# Patient Record
Sex: FEMALE | Race: WHITE
[De-identification: names, ages, dates, MRNs, and addresses within clinical notes are randomized per-mention and may not be internally consistent; named-entity substitution may affect disease eponyms.]

---

## 2020-11-09 ENCOUNTER — HOSPITAL ENCOUNTER (OUTPATIENT)
Dept: HOSPITAL 62 - RT | Age: 75
End: 2020-11-09
Attending: NURSE PRACTITIONER
Payer: MEDICARE

## 2020-11-09 DIAGNOSIS — I25.10: ICD-10-CM

## 2020-11-09 DIAGNOSIS — R06.02: Primary | ICD-10-CM

## 2020-11-09 DIAGNOSIS — E78.5: ICD-10-CM

## 2020-11-10 NOTE — PULMONARY FUNCTION TEST
Pulmonary Function Test


Date of Procedure:: 11/09/20


INDICATION:: Dyspnea


Referring Provider: Jesusita Figueroa [facility]


Technician: Segun Camargo, RRT





- Report


Spirometry: 





Spirometry:


 


pre-FVC: 1.39 L 52%                                     post-FVC: 1.28 L 48%    

               


 


pre-FEV:1 0.75 L 37%                                  post-FEV1: 0.73 L 36%     

                                                                                

    


 


pre-FEV1/FVC %: 54                         post-FEV1/FVC%: 57                  

predicted: 77                     


 


pre-FEF25-75%: 0.33 L 18%                        post-FEF25-75%: 0.36 L 19%


Impression: 





Moderate obstructive ventilatory defect.  Insignificant response to 

bronchodilator therapy.  This does not preclude a clinical trial of 

bronchodilator therapy.  Restrictive defect is inferred by the decreased flow in

the FVC.  (Restrictive defect may mask the degree of obstruction.)  If 

clinically indicated complete pulmonary function test would be useful.

## 2020-11-16 ENCOUNTER — HOSPITAL ENCOUNTER (OUTPATIENT)
Dept: HOSPITAL 62 - WI | Age: 75
End: 2020-11-16
Attending: NURSE PRACTITIONER
Payer: MEDICARE

## 2020-11-16 DIAGNOSIS — R06.02: ICD-10-CM

## 2020-11-16 DIAGNOSIS — R91.8: ICD-10-CM

## 2020-11-16 DIAGNOSIS — Z72.0: ICD-10-CM

## 2020-11-16 DIAGNOSIS — Z12.2: ICD-10-CM

## 2020-11-16 DIAGNOSIS — R91.1: ICD-10-CM

## 2020-11-16 DIAGNOSIS — Z12.31: Primary | ICD-10-CM

## 2020-11-16 DIAGNOSIS — R09.89: ICD-10-CM

## 2020-11-16 PROCEDURE — 77067 SCR MAMMO BI INCL CAD: CPT

## 2020-11-16 PROCEDURE — G0297 LDCT FOR LUNG CA SCREEN: HCPCS

## 2020-11-17 NOTE — RADIOLOGY REPORT (SQ)
EXAM DESCRIPTION:  CT LUNG CANCER SCREENING



IMAGES COMPLETED DATE/TIME:  11/16/2020 3:23 pm



REASON FOR STUDY:  Z72.0 TOBACCO ABUSE Z87.891 PERSONAL HISTORY OF NICOTINE DEPENDENCE Z12.31  ENCNTR
 SCREEN MAMMOGRAM FOR MALIGNANT NEOPLASM OF DEBORAH Z87.891  PERSONAL HISTORY OF NICOTINE DEPENDENCE Z72.
0  TOBACCO USE

Has the patient had a Chest CT scan within the past year? N

Was the patient offered tobacco cessation counseling? Y

Was the patient engaged in shared decision making for this test? Y

Does the patient have signs or symptoms of Lung Cancer? N

Is the patient a smoker? Y

How many pack years? 55Y

How many years since quitting smoking? NA

Patients age: 75



COMPARISON:  None.



TECHNIQUE:  Low Dose CT scan performed of the chest without intravenous contrast for purposes of scre
ening for lung cancer.  Images reviewed with lung, soft tissue and bone windows.  Reconstructed coron
al and sagittal MPR images reviewed.  All images stored on PACS.

All CT scanners at this facility use dose modulation, iterative reconstruction, and/or weight based d
osing when appropriate to reduce radiation dose to as low as reasonably achievable (ALARA).

[facility]: Dose Right  [facility]: CareDose    [facility]: Dose Right    [facility]: Teradose 4D    [facility]: Smart Technologies



RADIATION DOSE:  CT Rad equipment meets quality standard of care and radiation dose reduction techniq
ues were employed. CTDIvol: 2.0 mGy. DLP: 77 mGy-cm.  mGy.

 .



LIMITATIONS:  No technical limitations.



FINDINGS:  LUNG NODULES:

1. Solid 3 mm nodule in the apical segment of the right upper lobe (image 45 of series 3).

2. Solid 5 mm nodule in the right lower lobe (image 131 of series 3).

3. Solid 3 mm nodule in the apicoposterior segment of the left upper lobe (image 42 of series 3).

REMAINING LUNGS AND PLEURA:    The trachea and main bronchi are patent.  There is upper lobe predomin
ant centrilobular emphysema associated with bronchial wall thickening and areas of subsegmental mucou
s plugging in the right upper lobe and right lower lobe (image 157 of series 3).  The curvilinear opa
cities in the lingula and dependent portion of the lower lobes could represent subsegmental atelectas
is.  There is no acute consolidation, pleural effusion or pneumothorax.

HILAR AND MEDIASTINAL STRUCTURES: There is a borderline enlarged subcarinal lymph node that measures 
12 mm in short axis diameter (image 226 of series 2).

HEART AND VASCULAR STRUCTURES: No thoracic aortic aneurysm or intramural hematoma.  No cardiomegaly o
r pericardial effusion.

CORONARY ARTERY CALCIFICATIONS: Mild.

UPPER ABDOMEN, THYROID, BONES, OTHER SOFT TISSUES: Surgical clips in the left suprarenal fossa and de
generative spondylosis of the lumbar spine.



IMPRESSION:  1. Solid less than 5 mm nodules as detailed above.

2.  Other secondary findings as detailed above.



LUNGRADS:  LUNGRADS: 2 BENIGN APPEARANCE OR BEHAVIOR.  NODULES WITH A VERY LOW LIKELIHOOD OF BECOMING
 A CLINICALLY ACTIVE CANCER DUE TO SIZE OR LACK OF GROWTH.

MODIFIER: NONE.



RECOMMENDATION:  Continue annual screening with LDCT in 12 months.



COMMENT:  CRITERIA:

Solid nodule(s):  < 6 mm; new < 4 mm.

Part solid nodule(s):  < 6 mm total diameter on baseline screening.

Non solid nodule(s) (GGN):  < 20 mm OR ? 20 and unchanged or slowly growing.

Category 3 or 4 nodules unchanged for ? 3 months.



TECHNICAL DOCUMENTATION:  JOB ID:  [id number]

Quality ID # 436: Final reports with documentation of one or more dose reduction techniques (e.g., Au
tomated exposure control, adjustment of the mA and/or kV according to patient size, use of iterative 
reconstruction technique)

 2011 [facility]



Reading location - IP/workstation name: [email or web address]

## 2020-11-25 NOTE — WOMENS IMAGING REPORT
EXAM DESCRIPTION:  BILAT SCREENING MAMMO W/CAD



IMAGES COMPLETED DATE/TIME:  11/16/2020 4:00 pm



REASON FOR STUDY:  Z12.31 ENCNTR SCREEN MAMMOGRAM FOR MALIGNANT NEOPLASM OF BREAST Z12.31  ENCNTR SCR
EEN MAMMOGRAM FOR MALIGNANT NEOPLASM OF DEBORAH Z87.891  PERSONAL HISTORY OF NICOTINE DEPENDENCE Z72.0  T
OBACCO USE



COMPARISON:  Requested but never received.



EXAM PARAMETERS:  Standard craniocaudal and mediolateral oblique views of each breast recorded using 
digital acquisition.

Read with the assistance of CAD.

.[facility] [employer]  Version 9.2



LIMITATIONS:  None.



FINDINGS:  No suspicious masses, suspicious calcifications or architectural distortion. No areas of c
oncern.



IMPRESSION:  NEGATIVE MAMMOGRAM.  BIRADS 1



BREAST DENSITY:  b. There are scattered areas of fibroglandular density.



BIRAD:  ASSESSMENT:  1 NEGATIVE



RECOMMENDATION:  ROUTINE SCREENING



COMMENT:  The patient has been notified of the results by letter per MQSA requirements. Additional no
tification policies are in place for contacting patient with suspicious or incomplete findings.

Quality ID #225: The American College of Radiology recommends an annual screening mammogram for women
 aged 40 years or over. This facility utilizes a reminder system to ensure that all patients receive 
reminder letters, and/or direct phone calls for appointments. This includes reminders for routine scr
eening mammograms, diagnostic mammograms, or other Breast Imaging Interventions when appropriate.  Th
is patient will be placed in the appropriate reminder system.



TECHNICAL DOCUMENTATION:  FINDING NUMBER: (1)

ASSESSMENT: (1)

JOB ID:  [id number]

 2011 [employer]- All Rights Reserved



Reading location - IP/workstation name: [email or web address]

## 2022-09-13 ENCOUNTER — APPOINTMENT (OUTPATIENT)
Dept: URBAN - NONMETROPOLITAN AREA CLINIC 49 | Age: 77
Setting detail: DERMATOLOGY
End: 2022-09-21

## 2022-09-13 VITALS — HEART RATE: 99 BPM | TEMPERATURE: 98.2 F | SYSTOLIC BLOOD PRESSURE: 101 MMHG | DIASTOLIC BLOOD PRESSURE: 66 MMHG

## 2022-09-13 VITALS — HEART RATE: 103 BPM | DIASTOLIC BLOOD PRESSURE: 61 MMHG | SYSTOLIC BLOOD PRESSURE: 95 MMHG

## 2022-09-13 PROBLEM — C44.729 SQUAMOUS CELL CARCINOMA OF SKIN OF LEFT LOWER LIMB, INCLUDING HIP: Status: ACTIVE | Noted: 2022-09-13

## 2022-09-13 PROCEDURE — 17313 MOHS 1 STAGE T/A/L: CPT

## 2022-09-13 PROCEDURE — OTHER MOHS SURGERY: OTHER

## 2022-09-13 NOTE — PROCEDURE: MOHS SURGERY
Implemented All Fall with Harm Risk Interventions:  [place] to call system. Call bell, personal items and telephone within reach. Instruct patient to call for assistance. Room bathroom lighting operational. Non-slip footwear when patient is off stretcher. Physically safe environment: no spills, clutter or unnecessary equipment. Stretcher in lowest position, wheels locked, appropriate side rails in place. Provide visual cue, wrist band, yellow gown, etc. Monitor gait and stability. Monitor for mental status changes and reorient to person, place, and time. Review medications for side effects contributing to fall risk. Reinforce activity limits and safety measures with patient and family. Provide visual clues: red socks. Chonodrocutaneous Helical Advancement Flap Text: The defect edges were debeveled with a #15 scalpel blade.  Given the location of the defect and the proximity to free margins a chondrocutaneous helical advancement flap was deemed most appropriate.  Using a sterile surgical marker, the appropriate advancement flap was drawn incorporating the defect and placing the expected incisions within the relaxed skin tension lines where possible.    The area thus outlined was incised deep to adipose tissue with a #15 scalpel blade.  The skin margins were undermined to an appropriate distance in all directions utilizing iris scissors.

## 2022-09-13 NOTE — PROCEDURE: MOHS SURGERY
Yes Split-Thickness Skin Graft Text: The defect edges were debeveled with a #15 scalpel blade.  Given the location of the defect, shape of the defect and the proximity to free margins a split thickness skin graft was deemed most appropriate.  Using a sterile surgical marker, the primary defect shape was transferred to the donor site. The split thickness graft was then harvested.  The skin graft was then placed in the primary defect and oriented appropriately.

## 2022-09-13 NOTE — PROCEDURE: MOHS SURGERY
Home Bilobed Transposition Flap Text: The defect edges were debeveled with a #15 scalpel blade.  Given the location of the defect and the proximity to free margins a bilobed transposition flap was deemed most appropriate.  Using a sterile surgical marker, an appropriate bilobe flap drawn around the defect.    The area thus outlined was incised deep to adipose tissue with a #15 scalpel blade.  The skin margins were undermined to an appropriate distance in all directions utilizing iris scissors.

## 2023-09-17 ENCOUNTER — HOSPITAL ENCOUNTER (EMERGENCY)
Facility: HOSPITAL | Age: 78
Discharge: HOME | End: 2023-09-17
Attending: EMERGENCY MEDICINE
Payer: MEDICARE

## 2023-09-17 ENCOUNTER — APPOINTMENT (OUTPATIENT)
Facility: HOSPITAL | Age: 78
End: 2023-09-17
Attending: EMERGENCY MEDICINE
Payer: MEDICARE

## 2023-09-17 VITALS
RESPIRATION RATE: 20 BRPM | BODY MASS INDEX: 16.62 KG/M2 | DIASTOLIC BLOOD PRESSURE: 97 MMHG | SYSTOLIC BLOOD PRESSURE: 150 MMHG | TEMPERATURE: 96.5 F | HEIGHT: 61 IN | HEART RATE: 104 BPM | OXYGEN SATURATION: 96 % | WEIGHT: 88 LBS

## 2023-09-17 VITALS
SYSTOLIC BLOOD PRESSURE: 154 MMHG | DIASTOLIC BLOOD PRESSURE: 89 MMHG | OXYGEN SATURATION: 98 % | HEART RATE: 88 BPM | RESPIRATION RATE: 20 BRPM | TEMPERATURE: 98.1 F

## 2023-09-17 DIAGNOSIS — G89.29 CHRONIC BACK PAIN, UNSPECIFIED BACK LOCATION, UNSPECIFIED BACK PAIN LATERALITY: Primary | ICD-10-CM

## 2023-09-17 DIAGNOSIS — N17.9 AKI (ACUTE KIDNEY INJURY) (HCC): ICD-10-CM

## 2023-09-17 DIAGNOSIS — J44.9 CHRONIC OBSTRUCTIVE PULMONARY DISEASE, UNSPECIFIED COPD TYPE (HCC): ICD-10-CM

## 2023-09-17 DIAGNOSIS — E87.1 HYPONATREMIA: ICD-10-CM

## 2023-09-17 DIAGNOSIS — M54.9 CHRONIC BACK PAIN, UNSPECIFIED BACK LOCATION, UNSPECIFIED BACK PAIN LATERALITY: Primary | ICD-10-CM

## 2023-09-17 DIAGNOSIS — E87.6 HYPOKALEMIA: ICD-10-CM

## 2023-09-17 DIAGNOSIS — R53.1 GENERAL WEAKNESS: ICD-10-CM

## 2023-09-17 DIAGNOSIS — U07.1 COVID: ICD-10-CM

## 2023-09-17 DIAGNOSIS — E86.0 DEHYDRATION: ICD-10-CM

## 2023-09-17 LAB
ALBUMIN SERPL-MCNC: 3.5 G/DL (ref 3.5–5)
ALBUMIN/GLOB SERPL: 1 (ref 1.1–2.2)
ALP SERPL-CCNC: 80 U/L (ref 45–117)
ALT SERPL-CCNC: 38 U/L (ref 12–78)
ANION GAP SERPL CALC-SCNC: 10 MMOL/L (ref 5–15)
ANION GAP SERPL CALC-SCNC: 9 MMOL/L (ref 5–15)
APPEARANCE UR: CLEAR
AST SERPL W P-5'-P-CCNC: 32 U/L (ref 15–37)
BACTERIA URNS QL MICRO: ABNORMAL /HPF
BASOPHILS # BLD: 0 K/UL (ref 0–0.1)
BASOPHILS NFR BLD: 0 % (ref 0–1)
BILIRUB SERPL-MCNC: 0.7 MG/DL (ref 0.2–1)
BILIRUB UR QL: NEGATIVE
BUN SERPL-MCNC: 19 MG/DL (ref 6–20)
BUN SERPL-MCNC: 21 MG/DL (ref 6–20)
BUN/CREAT SERPL: 9 (ref 12–20)
BUN/CREAT SERPL: 9 (ref 12–20)
CA-I BLD-MCNC: 8 MG/DL (ref 8.5–10.1)
CA-I BLD-MCNC: 8.3 MG/DL (ref 8.5–10.1)
CHLORIDE SERPL-SCNC: 100 MMOL/L (ref 97–108)
CHLORIDE SERPL-SCNC: 96 MMOL/L (ref 97–108)
CO2 SERPL-SCNC: 23 MMOL/L (ref 21–32)
CO2 SERPL-SCNC: 25 MMOL/L (ref 21–32)
COLOR UR: ABNORMAL
CREAT SERPL-MCNC: 2.14 MG/DL (ref 0.55–1.02)
CREAT SERPL-MCNC: 2.25 MG/DL (ref 0.55–1.02)
DIFFERENTIAL METHOD BLD: ABNORMAL
EOSINOPHIL # BLD: 0 K/UL (ref 0–0.4)
EOSINOPHIL NFR BLD: 0 % (ref 0–7)
ERYTHROCYTE [DISTWIDTH] IN BLOOD BY AUTOMATED COUNT: 13 % (ref 11.5–14.5)
GLOBULIN SER CALC-MCNC: 3.6 G/DL (ref 2–4)
GLUCOSE SERPL-MCNC: 102 MG/DL (ref 65–100)
GLUCOSE SERPL-MCNC: 86 MG/DL (ref 65–100)
GLUCOSE UR STRIP.AUTO-MCNC: NEGATIVE MG/DL
HCT VFR BLD AUTO: 37.4 % (ref 35–47)
HGB BLD-MCNC: 12.5 G/DL (ref 11.5–16)
HGB UR QL STRIP: NEGATIVE
IMM GRANULOCYTES # BLD AUTO: 0.1 K/UL (ref 0–0.04)
IMM GRANULOCYTES NFR BLD AUTO: 1 % (ref 0–0.5)
KETONES UR QL STRIP.AUTO: NEGATIVE MG/DL
LACTATE SERPL-SCNC: 0.6 MMOL/L (ref 0.4–2)
LEUKOCYTE ESTERASE UR QL STRIP.AUTO: ABNORMAL
LYMPHOCYTES # BLD: 1.8 K/UL (ref 0.8–3.5)
LYMPHOCYTES NFR BLD: 17 % (ref 12–49)
MCH RBC QN AUTO: 29.9 PG (ref 26–34)
MCHC RBC AUTO-ENTMCNC: 33.4 G/DL (ref 30–36.5)
MCV RBC AUTO: 89.5 FL (ref 80–99)
MONOCYTES # BLD: 0.8 K/UL (ref 0–1)
MONOCYTES NFR BLD: 8 % (ref 5–13)
NEUTS SEG # BLD: 7.9 K/UL (ref 1.8–8)
NEUTS SEG NFR BLD: 74 % (ref 32–75)
NITRITE UR QL STRIP.AUTO: NEGATIVE
NRBC # BLD: 0 K/UL (ref 0–0.01)
NRBC BLD-RTO: 0 PER 100 WBC
PH UR STRIP: 6 (ref 5–8)
PLATELET # BLD AUTO: 351 K/UL (ref 150–400)
PMV BLD AUTO: 9.3 FL (ref 8.9–12.9)
POTASSIUM SERPL-SCNC: 3.2 MMOL/L (ref 3.5–5.1)
POTASSIUM SERPL-SCNC: 3.4 MMOL/L (ref 3.5–5.1)
PROT SERPL-MCNC: 7.1 G/DL (ref 6.4–8.2)
PROT UR STRIP-MCNC: NEGATIVE MG/DL
RBC # BLD AUTO: 4.18 M/UL (ref 3.8–5.2)
RBC #/AREA URNS HPF: ABNORMAL /HPF (ref 0–3)
SODIUM SERPL-SCNC: 128 MMOL/L (ref 136–145)
SODIUM SERPL-SCNC: 135 MMOL/L (ref 136–145)
SP GR UR REFRACTOMETRY: 1.01 (ref 1–1.03)
URINE CULTURE IF INDICATED: ABNORMAL
UROBILINOGEN UR QL STRIP.AUTO: 0.2 EU/DL (ref 0.2–1)
WBC # BLD AUTO: 10.6 K/UL (ref 3.6–11)
WBC URNS QL MICRO: ABNORMAL /HPF (ref 0–5)

## 2023-09-17 PROCEDURE — 85025 COMPLETE CBC W/AUTO DIFF WBC: CPT

## 2023-09-17 PROCEDURE — 6370000000 HC RX 637 (ALT 250 FOR IP): Performed by: EMERGENCY MEDICINE

## 2023-09-17 PROCEDURE — 94640 AIRWAY INHALATION TREATMENT: CPT

## 2023-09-17 PROCEDURE — 80053 COMPREHEN METABOLIC PANEL: CPT

## 2023-09-17 PROCEDURE — 71046 X-RAY EXAM CHEST 2 VIEWS: CPT

## 2023-09-17 PROCEDURE — 99284 EMERGENCY DEPT VISIT MOD MDM: CPT

## 2023-09-17 PROCEDURE — 96360 HYDRATION IV INFUSION INIT: CPT

## 2023-09-17 PROCEDURE — 83605 ASSAY OF LACTIC ACID: CPT

## 2023-09-17 PROCEDURE — 81001 URINALYSIS AUTO W/SCOPE: CPT

## 2023-09-17 PROCEDURE — 80048 BASIC METABOLIC PNL TOTAL CA: CPT

## 2023-09-17 PROCEDURE — 99281 EMR DPT VST MAYX REQ PHY/QHP: CPT

## 2023-09-17 PROCEDURE — 36415 COLL VENOUS BLD VENIPUNCTURE: CPT

## 2023-09-17 PROCEDURE — 2580000003 HC RX 258: Performed by: EMERGENCY MEDICINE

## 2023-09-17 PROCEDURE — 96361 HYDRATE IV INFUSION ADD-ON: CPT

## 2023-09-17 RX ORDER — NIRMATRELVIR AND RITONAVIR 300-100 MG
KIT ORAL
COMMUNITY
Start: 2023-09-11

## 2023-09-17 RX ORDER — ASPIRIN 81 MG/1
TABLET ORAL
COMMUNITY

## 2023-09-17 RX ORDER — CLONAZEPAM 0.5 MG/1
TABLET ORAL
COMMUNITY
Start: 2023-04-20

## 2023-09-17 RX ORDER — HYDROCODONE BITARTRATE AND ACETAMINOPHEN 5; 325 MG/1; MG/1
1 TABLET ORAL
Status: COMPLETED | OUTPATIENT
Start: 2023-09-17 | End: 2023-09-17

## 2023-09-17 RX ORDER — BUSPIRONE HYDROCHLORIDE 15 MG/1
15 TABLET ORAL 2 TIMES DAILY
COMMUNITY
Start: 2023-08-25

## 2023-09-17 RX ORDER — POTASSIUM CHLORIDE 20 MEQ/1
40 TABLET, EXTENDED RELEASE ORAL ONCE
Status: DISCONTINUED | OUTPATIENT
Start: 2023-09-17 | End: 2023-09-17 | Stop reason: HOSPADM

## 2023-09-17 RX ORDER — 0.9 % SODIUM CHLORIDE 0.9 %
1000 INTRAVENOUS SOLUTION INTRAVENOUS
Status: COMPLETED | OUTPATIENT
Start: 2023-09-17 | End: 2023-09-17

## 2023-09-17 RX ORDER — IPRATROPIUM BROMIDE AND ALBUTEROL SULFATE 2.5; .5 MG/3ML; MG/3ML
1 SOLUTION RESPIRATORY (INHALATION)
Status: COMPLETED | OUTPATIENT
Start: 2023-09-17 | End: 2023-09-17

## 2023-09-17 RX ORDER — OXYCODONE HYDROCHLORIDE AND ACETAMINOPHEN 5; 325 MG/1; MG/1
1 TABLET ORAL
Status: DISCONTINUED | OUTPATIENT
Start: 2023-09-17 | End: 2023-09-18 | Stop reason: HOSPADM

## 2023-09-17 RX ORDER — CITALOPRAM 40 MG/1
TABLET ORAL
COMMUNITY

## 2023-09-17 RX ADMIN — HYDROCODONE BITARTRATE AND ACETAMINOPHEN 1 TABLET: 5; 325 TABLET ORAL at 13:36

## 2023-09-17 RX ADMIN — IPRATROPIUM BROMIDE AND ALBUTEROL SULFATE 1 DOSE: .5; 3 SOLUTION RESPIRATORY (INHALATION) at 14:05

## 2023-09-17 RX ADMIN — SODIUM CHLORIDE 1000 ML: 9 INJECTION, SOLUTION INTRAVENOUS at 13:24

## 2023-09-17 ASSESSMENT — PAIN - FUNCTIONAL ASSESSMENT
PAIN_FUNCTIONAL_ASSESSMENT: 0-10
PAIN_FUNCTIONAL_ASSESSMENT: 0-10

## 2023-09-17 ASSESSMENT — PAIN SCALES - GENERAL
PAINLEVEL_OUTOF10: 8
PAINLEVEL_OUTOF10: 10

## 2023-09-17 ASSESSMENT — PAIN DESCRIPTION - LOCATION
LOCATION: BACK
LOCATION: BACK

## 2023-09-17 ASSESSMENT — LIFESTYLE VARIABLES
HOW OFTEN DO YOU HAVE A DRINK CONTAINING ALCOHOL: NEVER
HOW MANY STANDARD DRINKS CONTAINING ALCOHOL DO YOU HAVE ON A TYPICAL DAY: PATIENT DOES NOT DRINK

## 2023-09-17 NOTE — DISCHARGE INSTRUCTIONS
improve as expected and you are unable to reach your health care provider, please return to the Emergency Department. We are available 24 hours a day. If a prescription has been provided, please have it filled as soon as possible to prevent a delay in treatment. If you have any questions or reservations about taking the medication due to side effects or interactions with other medications, please call your primary care provider or contact the ER.

## 2023-09-17 NOTE — ED PROVIDER NOTES
Unless otherwise noted above, none. Procedures      CRITICAL CARE TIME   Patient does not meet Critical Care Time, 0 minutes    FINAL IMPRESSION     1. Chronic back pain, unspecified back location, unspecified back pain laterality    2. Chronic obstructive pulmonary disease, unspecified COPD type ([address])    3. General weakness    4. COVID    5. Hyponatremia    6. Hypokalemia    7. ESTEE (acute kidney injury) ([address])    8. Dehydration          DISPOSITION/PLAN   DISPOSITION Decision To Discharge 09/17/2023 03:11:39 PM    Discharge Note: The patient is stable for discharge home. The signs, symptoms, diagnosis, and discharge instructions have been discussed, understanding conveyed, and agreed upon. The patient is to follow up as recommended or return to ER should their symptoms worsen. PATIENT REFERRED TO:  Analilia Desai MD  [address] [place] [id number] [phone]    Schedule an appointment as soon as possible for a visit       [facility] EMERGENCY DEPT  [facility] [address]  [phone]    If symptoms worsen        DISCHARGE MEDICATIONS:     Medication List        ASK your doctor about these medications      aspirin 81 MG EC tablet     busPIRone 15 MG tablet  Commonly known as: BUSPAR     citalopram 40 MG tablet  Commonly known as: CELEXA     clonazePAM 0.5 MG tablet  Commonly known as: KLONOPIN     Paxlovid (300/100) 20 x 150 MG & 10 x 100MG Tbpk  Generic drug: nirmatrelvir/ritonavir 300/100                DISCONTINUED MEDICATIONS:  Discharge Medication List as of 9/17/2023  3:21 PM          I am the Primary Clinician of Record: Odin Valdovinos MD (electronically signed)    (Please note that parts of this dictation were completed with voice recognition software. Quite often unanticipated grammatical, syntax, homophones, and other interpretive errors are inadvertently transcribed by the computer software. Please disregards these errors.  Please excuse any errors that have

## 2023-09-17 NOTE — ED NOTES
Report to [facility] has been up to bathroom  States she feels so much better since breathing treatment     Brice Martinez RN  09/17/23 [id number]

## 2023-09-17 NOTE — ED NOTES
Patient stable at time of discharge. Education and discharge paperwork is reviewed with patient and her son, both verbalize understanding of same. Patient ambulates from ED with all belongings.       Keaton Cardenas RN  09/17/23 5534

## 2023-09-17 NOTE — ED TRIAGE NOTES
PT reports mid to lower back pain, dehydration and dry mouth that started yesterday afternoon. PT covid + 1 week ago and finished paxlovid yesterday. PT reports stage 3 CKD. Pt reports urinary frequency bur denies urinary pain. Mild cough noted in triage.

## 2023-09-18 ASSESSMENT — ENCOUNTER SYMPTOMS
BACK PAIN: 1
GASTROINTESTINAL NEGATIVE: 1

## 2023-09-18 NOTE — ED NOTES
[facility] EMERGENCY DEPT  EMERGENCY DEPARTMENT ENCOUNTER      Pt Name: Ubaldo Vinson  MRN: [id number]  [address] [facility] 1945  Date of evaluation: 9/17/2023  Provider: Debbi Ford MD    [address]       Chief Complaint   Patient presents with    Fatigue    Back Pain    Dehydration                HISTORY OF PRESENT ILLNESS   (Location/Symptom, Timing/Onset, Context/Setting, Quality, Duration, Modifying Factors, Severity)  Note limiting factors. Ubaldo Vinson is a 66 y.o. female who presents to the emergency department with complaint of chronic back pain. Review of chart shows pain is due to spinal stenosis. She is in the pain clinic but the provider will not refill oxycodone prescription because of possible misuse. Pain is severe that she cannot sleep. History is provided by her son who brought her to the ER. Nursing Notes were reviewed. REVIEW OF SYSTEMS    (2-9 systems for level 4, 10 or more for level 5)     Review of Systems   Constitutional: Negative. HENT: Negative. Cardiovascular: Negative. Gastrointestinal: Negative. Genitourinary: Negative. Musculoskeletal:  Positive for back pain. Neurological: Negative. Hematological: Negative. Except as noted above the remainder of the review of systems was reviewed and negative. PAST MEDICAL HISTORY     Past Medical History:   Diagnosis Date    Crohn's disease ([address])     Stage 3 chronic kidney disease ([address])          SURGICAL HISTORY     History reviewed. No pertinent surgical history. CURRENT MEDICATIONS       Discharge Medication List as of 9/17/2023 11:40 PM        CONTINUE these medications which have NOT CHANGED    Details   aspirin 81 MG EC tablet Take 1 tablet every day by oral route. Historical Med      busPIRone (BUSPAR) 15 MG tablet Take 15 mg by mouth 2 times dailyHistorical Med      citalopram (CELEXA) 40 MG tablet Historical Med      clonazePAM (KLONOPIN) 0.5 MG tablet Historical Med      PAXLOVID, 300/100, 20

## 2023-09-18 NOTE — ED TRIAGE NOTES
Pt presents to ED for second visit today for c/o generalized weakness, back pain and dehydration. Pt reportedly had low potassium with earlier visit.

## 2023-09-18 NOTE — ED NOTES
Pt seen leaving dept at this time with family member. Did not stay for discharge paperwork or medication administration.      Loi Barnhart RN  09/17/23 [id number]

## 2024-03-12 ENCOUNTER — APPOINTMENT (OUTPATIENT)
Dept: URBAN - NONMETROPOLITAN AREA CLINIC 49 | Age: 79
Setting detail: DERMATOLOGY
End: 2024-03-22

## 2024-03-12 VITALS — HEART RATE: 95 BPM | SYSTOLIC BLOOD PRESSURE: 104 MMHG | DIASTOLIC BLOOD PRESSURE: 70 MMHG

## 2024-03-12 VITALS — HEART RATE: 84 BPM | SYSTOLIC BLOOD PRESSURE: 111 MMHG | DIASTOLIC BLOOD PRESSURE: 68 MMHG

## 2024-03-12 PROBLEM — C44.722 SQUAMOUS CELL CARCINOMA OF SKIN OF RIGHT LOWER LIMB, INCLUDING HIP: Status: ACTIVE | Noted: 2024-03-12

## 2024-03-12 PROCEDURE — OTHER MOHS SURGERY: OTHER

## 2024-03-12 PROCEDURE — 17313 MOHS 1 STAGE T/A/L: CPT

## 2024-03-12 PROCEDURE — OTHER MIPS QUALITY: OTHER

## 2024-03-12 NOTE — PROCEDURE: MOHS SURGERY
Body Location Override (Optional - Billing Will Still Be Based On Selected Body Map Location If Applicable): Right lateral distal pretibial region